# Patient Record
Sex: FEMALE | Race: WHITE | Employment: OTHER | URBAN - METROPOLITAN AREA
[De-identification: names, ages, dates, MRNs, and addresses within clinical notes are randomized per-mention and may not be internally consistent; named-entity substitution may affect disease eponyms.]

---

## 2024-09-16 LAB
BILIRUB UR QL STRIP.AUTO: NEGATIVE
FLUAV + FLUBV RNA SPEC NAA+PROBE: NEGATIVE
FLUAV + FLUBV RNA SPEC NAA+PROBE: NEGATIVE
GLUCOSE UR STRIP.AUTO-MCNC: NORMAL MG/DL
KETONES UR STRIP.AUTO-MCNC: NEGATIVE MG/DL
LEUKOCYTE ESTERASE UR QL STRIP.AUTO: 500
PH UR STRIP.AUTO: 5.5 [PH] (ref 5–8)
PROT UR STRIP.AUTO-MCNC: 50 MG/DL
RBC #/AREA URNS AUTO: >10 /HPF
RSV RNA SPEC NAA+PROBE: NEGATIVE
SARS-COV-2 RNA RESP QL NAA+PROBE: NOT DETECTED
SP GR UR STRIP.AUTO: 1.01 (ref 1–1.03)
UROBILINOGEN UR STRIP.AUTO-MCNC: NORMAL MG/DL
WBC #/AREA URNS AUTO: >50 /HPF

## 2024-09-16 PROCEDURE — 87088 URINE BACTERIA CULTURE: CPT

## 2024-09-16 PROCEDURE — 36415 COLL VENOUS BLD VENIPUNCTURE: CPT

## 2024-09-16 PROCEDURE — 87186 SC STD MICRODIL/AGAR DIL: CPT

## 2024-09-16 PROCEDURE — 99285 EMERGENCY DEPT VISIT HI MDM: CPT

## 2024-09-16 PROCEDURE — 87086 URINE CULTURE/COLONY COUNT: CPT

## 2024-09-16 PROCEDURE — 81001 URINALYSIS AUTO W/SCOPE: CPT

## 2024-09-16 PROCEDURE — 0241U SARS-COV-2/FLU A AND B/RSV BY PCR (GENEXPERT): CPT

## 2024-09-16 RX ORDER — IBUPROFEN 600 MG/1
600 TABLET, FILM COATED ORAL EVERY 6 HOURS PRN
Status: DISCONTINUED | OUTPATIENT
Start: 2024-09-16 | End: 2024-09-16

## 2024-09-16 RX ORDER — IBUPROFEN 600 MG/1
600 TABLET, FILM COATED ORAL ONCE
Status: COMPLETED | OUTPATIENT
Start: 2024-09-16 | End: 2024-09-16

## 2024-09-17 ENCOUNTER — APPOINTMENT (OUTPATIENT)
Dept: GENERAL RADIOLOGY | Facility: HOSPITAL | Age: 79
End: 2024-09-17
Attending: EMERGENCY MEDICINE
Payer: MEDICARE

## 2024-09-17 ENCOUNTER — APPOINTMENT (OUTPATIENT)
Dept: CT IMAGING | Facility: HOSPITAL | Age: 79
End: 2024-09-17
Attending: HOSPITALIST
Payer: MEDICARE

## 2024-09-17 ENCOUNTER — HOSPITAL ENCOUNTER (INPATIENT)
Facility: HOSPITAL | Age: 79
LOS: 1 days | Discharge: HOME | End: 2024-09-19
Attending: EMERGENCY MEDICINE | Admitting: HOSPITALIST
Payer: MEDICARE

## 2024-09-17 DIAGNOSIS — A41.9 SEPSIS DUE TO URINARY TRACT INFECTION (HCC): Primary | ICD-10-CM

## 2024-09-17 DIAGNOSIS — N39.0 SEPSIS DUE TO URINARY TRACT INFECTION (HCC): Primary | ICD-10-CM

## 2024-09-17 LAB
ALBUMIN SERPL-MCNC: 4.1 G/DL (ref 3.2–4.8)
ALBUMIN/GLOB SERPL: 1.1 {RATIO} (ref 1–2)
ALP LIVER SERPL-CCNC: 81 U/L
ALT SERPL-CCNC: 16 U/L
ANION GAP SERPL CALC-SCNC: 11 MMOL/L (ref 0–18)
AST SERPL-CCNC: 21 U/L (ref ?–34)
BASOPHILS # BLD AUTO: 0.05 X10(3) UL (ref 0–0.2)
BASOPHILS NFR BLD AUTO: 0.2 %
BILIRUB SERPL-MCNC: 0.7 MG/DL (ref 0.2–1.1)
BUN BLD-MCNC: 32 MG/DL (ref 9–23)
CALCIUM BLD-MCNC: 10.1 MG/DL (ref 8.7–10.4)
CHLORIDE SERPL-SCNC: 97 MMOL/L (ref 98–112)
CO2 SERPL-SCNC: 24 MMOL/L (ref 21–32)
CREAT BLD-MCNC: 1.09 MG/DL
EGFRCR SERPLBLD CKD-EPI 2021: 52 ML/MIN/1.73M2 (ref 60–?)
EOSINOPHIL # BLD AUTO: 0 X10(3) UL (ref 0–0.7)
EOSINOPHIL NFR BLD AUTO: 0 %
ERYTHROCYTE [DISTWIDTH] IN BLOOD BY AUTOMATED COUNT: 12.8 %
GLOBULIN PLAS-MCNC: 3.8 G/DL (ref 2–3.5)
GLUCOSE BLD-MCNC: 129 MG/DL (ref 70–99)
GLUCOSE BLD-MCNC: 142 MG/DL (ref 70–99)
GLUCOSE BLD-MCNC: 159 MG/DL (ref 70–99)
GLUCOSE BLD-MCNC: 177 MG/DL (ref 70–99)
GLUCOSE BLD-MCNC: 185 MG/DL (ref 70–99)
GLUCOSE BLD-MCNC: 201 MG/DL (ref 70–99)
HCT VFR BLD AUTO: 33.7 %
HGB BLD-MCNC: 11.8 G/DL
IMM GRANULOCYTES # BLD AUTO: 0.11 X10(3) UL (ref 0–1)
IMM GRANULOCYTES NFR BLD: 0.5 %
LACTATE SERPL-SCNC: 1.1 MMOL/L (ref 0.5–2)
LYMPHOCYTES # BLD AUTO: 1.53 X10(3) UL (ref 1–4)
LYMPHOCYTES NFR BLD AUTO: 7.6 %
MCH RBC QN AUTO: 30.9 PG (ref 26–34)
MCHC RBC AUTO-ENTMCNC: 35 G/DL (ref 31–37)
MCV RBC AUTO: 88.2 FL
MONOCYTES # BLD AUTO: 1.2 X10(3) UL (ref 0.1–1)
MONOCYTES NFR BLD AUTO: 6 %
NEUTROPHILS # BLD AUTO: 17.16 X10 (3) UL (ref 1.5–7.7)
NEUTROPHILS # BLD AUTO: 17.16 X10(3) UL (ref 1.5–7.7)
NEUTROPHILS NFR BLD AUTO: 85.7 %
OSMOLALITY SERPL CALC.SUM OF ELEC: 287 MOSM/KG (ref 275–295)
PLATELET # BLD AUTO: 239 10(3)UL (ref 150–450)
POTASSIUM SERPL-SCNC: 3.2 MMOL/L (ref 3.5–5.1)
PROT SERPL-MCNC: 7.9 G/DL (ref 5.7–8.2)
RBC # BLD AUTO: 3.82 X10(6)UL
SODIUM SERPL-SCNC: 132 MMOL/L (ref 136–145)
WBC # BLD AUTO: 20.1 X10(3) UL (ref 4–11)

## 2024-09-17 PROCEDURE — 80053 COMPREHEN METABOLIC PANEL: CPT

## 2024-09-17 PROCEDURE — 85025 COMPLETE CBC W/AUTO DIFF WBC: CPT

## 2024-09-17 PROCEDURE — 87040 BLOOD CULTURE FOR BACTERIA: CPT | Performed by: EMERGENCY MEDICINE

## 2024-09-17 PROCEDURE — 82962 GLUCOSE BLOOD TEST: CPT

## 2024-09-17 PROCEDURE — 96365 THER/PROPH/DIAG IV INF INIT: CPT

## 2024-09-17 PROCEDURE — 71045 X-RAY EXAM CHEST 1 VIEW: CPT | Performed by: EMERGENCY MEDICINE

## 2024-09-17 PROCEDURE — 83605 ASSAY OF LACTIC ACID: CPT | Performed by: EMERGENCY MEDICINE

## 2024-09-17 PROCEDURE — 96366 THER/PROPH/DIAG IV INF ADDON: CPT

## 2024-09-17 PROCEDURE — 74176 CT ABD & PELVIS W/O CONTRAST: CPT | Performed by: HOSPITALIST

## 2024-09-17 RX ORDER — PHENOL 1.4 %
10 AEROSOL, SPRAY (ML) MUCOUS MEMBRANE NIGHTLY PRN
COMMUNITY

## 2024-09-17 RX ORDER — PRAVASTATIN SODIUM 20 MG
20 TABLET ORAL NIGHTLY
Status: DISCONTINUED | OUTPATIENT
Start: 2024-09-17 | End: 2024-09-19

## 2024-09-17 RX ORDER — NICOTINE POLACRILEX 4 MG
30 LOZENGE BUCCAL
Status: DISCONTINUED | OUTPATIENT
Start: 2024-09-17 | End: 2024-09-19

## 2024-09-17 RX ORDER — SODIUM CHLORIDE 9 MG/ML
INJECTION, SOLUTION INTRAVENOUS CONTINUOUS
Status: ACTIVE | OUTPATIENT
Start: 2024-09-17 | End: 2024-09-18

## 2024-09-17 RX ORDER — SODIUM PHOSPHATE, DIBASIC AND SODIUM PHOSPHATE, MONOBASIC 7; 19 G/230ML; G/230ML
1 ENEMA RECTAL ONCE AS NEEDED
Status: DISCONTINUED | OUTPATIENT
Start: 2024-09-17 | End: 2024-09-19

## 2024-09-17 RX ORDER — SENNOSIDES 8.6 MG
17.2 TABLET ORAL NIGHTLY PRN
Status: DISCONTINUED | OUTPATIENT
Start: 2024-09-17 | End: 2024-09-19

## 2024-09-17 RX ORDER — BISACODYL 10 MG
10 SUPPOSITORY, RECTAL RECTAL
Status: DISCONTINUED | OUTPATIENT
Start: 2024-09-17 | End: 2024-09-19

## 2024-09-17 RX ORDER — AMLODIPINE BESYLATE 2.5 MG/1
2.5 TABLET ORAL DAILY
Status: DISCONTINUED | OUTPATIENT
Start: 2024-09-17 | End: 2024-09-19

## 2024-09-17 RX ORDER — NICOTINE POLACRILEX 4 MG
15 LOZENGE BUCCAL
Status: DISCONTINUED | OUTPATIENT
Start: 2024-09-17 | End: 2024-09-19

## 2024-09-17 RX ORDER — HEPARIN SODIUM 5000 [USP'U]/ML
5000 INJECTION, SOLUTION INTRAVENOUS; SUBCUTANEOUS EVERY 8 HOURS SCHEDULED
Status: DISCONTINUED | OUTPATIENT
Start: 2024-09-17 | End: 2024-09-19

## 2024-09-17 RX ORDER — AMLODIPINE BESYLATE 2.5 MG/1
2.5 TABLET ORAL DAILY
COMMUNITY

## 2024-09-17 RX ORDER — DEXTROSE MONOHYDRATE 25 G/50ML
50 INJECTION, SOLUTION INTRAVENOUS
Status: DISCONTINUED | OUTPATIENT
Start: 2024-09-17 | End: 2024-09-19

## 2024-09-17 RX ORDER — ACETAMINOPHEN 500 MG
1000 TABLET ORAL ONCE
Status: COMPLETED | OUTPATIENT
Start: 2024-09-17 | End: 2024-09-17

## 2024-09-17 RX ORDER — POLYETHYLENE GLYCOL 3350 17 G/17G
17 POWDER, FOR SOLUTION ORAL DAILY PRN
Status: DISCONTINUED | OUTPATIENT
Start: 2024-09-17 | End: 2024-09-19

## 2024-09-17 RX ORDER — ASPIRIN 81 MG/1
81 TABLET ORAL DAILY
Status: DISCONTINUED | OUTPATIENT
Start: 2024-09-17 | End: 2024-09-19

## 2024-09-17 RX ORDER — SODIUM CHLORIDE 9 MG/ML
INJECTION, SOLUTION INTRAVENOUS CONTINUOUS
Status: DISCONTINUED | OUTPATIENT
Start: 2024-09-17 | End: 2024-09-17

## 2024-09-17 RX ORDER — ACETAMINOPHEN 500 MG
500 TABLET ORAL EVERY 4 HOURS PRN
Status: DISCONTINUED | OUTPATIENT
Start: 2024-09-17 | End: 2024-09-19

## 2024-09-17 RX ORDER — GLUCOSAMINE/D3/BOSWELLIA SERRA 1500MG-400
1 TABLET ORAL DAILY
COMMUNITY

## 2024-09-17 RX ORDER — ONDANSETRON 2 MG/ML
4 INJECTION INTRAMUSCULAR; INTRAVENOUS EVERY 6 HOURS PRN
Status: DISCONTINUED | OUTPATIENT
Start: 2024-09-17 | End: 2024-09-19

## 2024-09-17 RX ORDER — FLUTICASONE PROPIONATE AND SALMETEROL 250; 50 UG/1; UG/1
1 POWDER RESPIRATORY (INHALATION) 2 TIMES DAILY
Status: DISCONTINUED | OUTPATIENT
Start: 2024-09-17 | End: 2024-09-19

## 2024-09-17 RX ORDER — METOCLOPRAMIDE HYDROCHLORIDE 5 MG/ML
10 INJECTION INTRAMUSCULAR; INTRAVENOUS EVERY 8 HOURS PRN
Status: DISCONTINUED | OUTPATIENT
Start: 2024-09-17 | End: 2024-09-19

## 2024-09-17 NOTE — H&P
[facility] H&P/Consult note       CC:   Chief Complaint   Patient presents with    Fever        PCP: Elly Ordoñez MD    History of Present Illness: Patient is a 78 year old female with PMH sig for HTN, HLD, Dm2, here for fevers, weakness    Hx from pt and son at bs  For the past 2-3 days has had fevers, chills. Also mild headaches with fevers and runny nose but she states she gets this with her allergies. No sob, cough. No sick contacts. No n/v. No abd pain. No diarrhea. She denied any dysuria but has had frequency which does not appear to be new. She folows with gyn and is scheduled to have pessary in the upconing days            PMH  Past Medical History:    Allergic rhinitis due to allergen    Asthma (HCC)    COPD (chronic obstructive pulmonary disease) (HCC)    Diabetes (HCC)    Eczema    Essential hypertension    GERD (gastroesophageal reflux disease)    Hyperlipidemia    Vitamin D deficiency        PSH  History reviewed. No pertinent surgical history.     ALL:  Allergies   Allergen Reactions    Seasonal UNKNOWN        Home Medications:  Outpatient Medications Marked as Taking for the 9/17/24 encounter (Hospital Encounter)   Medication Sig Dispense Refill    amLODIPine 2.5 MG Oral Tab Take 1 tablet (2.5 mg total) by mouth daily.      Biotin 75940 MCG Oral Tab Take 1 tablet by mouth daily.      Melatonin 10 MG Oral Tab Take 10 tablets by mouth nightly as needed.      losartan-hydroCHLOROthiazide 100-25 MG Oral Tab Take 1 tablet by mouth daily. 90 tablet 1    metFORMIN 500 MG Oral Tab Take 1 tablet (500 mg total) by mouth 2 (two) times daily with meals. 180 tablet 1    fluticasone propionate 50 MCG/ACT Nasal Suspension 2 sprays by Nasal route daily as needed for Allergies.      triamcinolone 0.5 % External Cream Apply 1 Application topically 2 (two) times daily as needed.      cetirizine 10 MG Oral Tab Take 1 tablet (10 mg total) by mouth daily.      fluticasone-salmeterol 250-50 MCG/DOSE Inhalation  Aerosol Powder, Breath Activated Inhale 1 puff into the lungs 2 (two) times daily as needed.      Multiple Vitamins-Minerals (CENTRUM SILVER 50+WOMEN) Oral Tab Take by mouth.      Vitamin E 180 MG (400 UNIT) Oral Cap Take by mouth.      Vitamin D3, Cholecalciferol, (VITAMIN D3) 125 MCG (5000 UT) Oral Cap Take 2,000 Units by mouth daily.      ASPIRIN 81 OR Take 81 mg by mouth daily.      Lovastatin 20 MG Oral Tab Take 1 tablet (20 mg total) by mouth daily. (Patient taking differently: Take 1 tablet (20 mg total) by mouth every evening.) 90 tablet 3         Soc Hx  Social History     Tobacco Use    Smoking status: Never    Smokeless tobacco: Never   Substance Use Topics    Alcohol use: Not Currently        Fam Hx  Family History   Problem Relation Age of Onset    Hypertension Father     Heart Disorder Mother     Diabetes Mother        Review of Systems  Comprehensive ROS reviewed and negative except for what's stated above.  Including negative for chest pain, shortness of breath, syncope.       OBJECTIVE:  /50 (BP Location: Left arm)   Pulse 87   Temp 98.3 °F (36.8 °C) (Oral)   Resp 12   Wt 147 lb 14.9 oz (67.1 kg)   SpO2 96%   BMI 26.63 kg/m²   General:  Alert, no distress, appears stated age.   Head:  Normocephalic, without obvious abnormality, atraumatic.   Eyes:  Sclera anicteric, No conjunctival pallor, EOMs intact.    Throat: MMM   Neck: Supple    Lungs:   Clear to auscultation bilaterally. Normal effort   Chest wall:  No tenderness or deformity.   Heart:  Regular rate and rhythm    Abdomen:   Soft, NT/ND, +bs   Extremities: Atraumatic, no cyanosis or edema.   Skin: No visible rashes or lesions.    Neurologic: Normal strength, no focal deficit appreciated     Diagnostic Data:    CBC/Chem  Recent Labs   Lab 09/17/24  0030   WBC 20.1*   HGB 11.8*   MCV 88.2   .0       Recent Labs   Lab 09/17/24  0030   *   K 3.2*   CL 97*   CO2 24.0   BUN 32*   CREATSERUM 1.09*   *   CA 10.1        Recent Labs   Lab 09/17/24  0030   ALT 16   AST 21   ALB 4.1       No results for input(s): \"TROP\" in the last 168 hours.         CXR: image personally reviewed     Radiology: XR CHEST AP PORTABLE  (CPT=71045)    Result Date: 9/17/2024  PROCEDURE:  XR CHEST AP PORTABLE  (CPT=71045)  TECHNIQUE:  AP chest radiograph was obtained.  COMPARISON:  None.  INDICATIONS:  fever and chills  PATIENT STATED HISTORY: (As transcribed by Technologist)  Patient shares she has fever and chills.    FINDINGS:  Cardiac size and pulmonary vasculature are within normal limits. No pleural effusions. No pneumothorax.  Minimal left basilar and right infrahilar opacity.  Atheromatous calcifications of the aorta.             CONCLUSION:  1. Minimal left basilar and right infrahilar opacity representing atelectasis versus infiltrates. 2. Hyperexpansion of the lungs. 3.  Preliminary report was provided by [facility] at time of examination.  Final report confirms findings on preliminary report without discrepancy.   LOCATION:  [facility]      Dictated by (CST): Dylan Mak MD on 9/17/2024 at 5:23 AM     Finalized by (CST): Dylan Mak MD on 9/17/2024 at 5:24 AM          ASSESSMENT / PLAN:     Patient is a 78 year old female with PMH sig for HTN, HLD, Dm2, here for fevers, weakness    Impression    -sepsis (fever, wbc, tachy) from  -UTI      -HTN  -HLD  -Dm 2  -hypokalemia  -hyponatremia  -mild anemia      Plan    -IVFs  -IVF abx - Rocephin  -blood cx pending  -Ucx pending  -check CT AP     -cont norvasc. Bp controlled currently  -hold acei /hydrochlorothiazide - can resume at later time if electrolytes ok and bp is high   -cont statin  -cont ISS    Scds  Heparin      Further recommendations pending patient's clinical course. [facility] hospitalist to continue to follow patient while in house    Patient and/or patient's family given opportunity to ask questions and note understanding and agreeing with therapeutic plan as  [facility] Hospitalist  [phone]  Answering Service: [phone]

## 2024-09-17 NOTE — PLAN OF CARE
Resumed care at 0730. Aox4, vitals stable. Ambulating in bathroom, hallways steady gait. IVF infusing per order, tolerating PO diet. Denies pain.

## 2024-09-17 NOTE — ED PROVIDER NOTES
Patient Seen in: [facility] Emergency Department      History     Chief Complaint   Patient presents with    Fever     Stated Complaint: fever and chills    Subjective:   HPI    78-year-old female presenting emergency department for fever.  Since last night the patient's been having fevers and chills no cough no difficulty breathing no abdominal pain no other exacerbating relieving factors or associated symptoms.    Objective:   Past Medical History:    Allergic rhinitis due to allergen    Asthma (HCC)    COPD (chronic obstructive pulmonary disease) (HCC)    Diabetes (HCC)    Eczema    Essential hypertension    GERD (gastroesophageal reflux disease)    Hyperlipidemia    Vitamin D deficiency              History reviewed. No pertinent surgical history.             Social History     Socioeconomic History    Marital status:    Tobacco Use    Smoking status: Never    Smokeless tobacco: Never   Vaping Use    Vaping status: Never Used   Substance and Sexual Activity    Alcohol use: Not Currently    Drug use: Never              Review of Systems    Positive for stated Chief Complaint: Fever    Other systems are as noted in HPI.  Constitutional and vital signs reviewed.      All other systems reviewed and negative except as noted above.    Physical Exam     ED Triage Vitals [09/16/24 2256]   /69   Pulse (!) 128   Resp 16   Temp (!) 101.6 °F (38.7 °C)   Temp src Oral   SpO2 95 %   O2 Device None (Room air)       Current Vitals:   Vital Signs  BP: 140/52  Pulse: 100  Resp: 16  Temp: 99 °F (37.2 °C)  Temp src: Oral  MAP (mmHg): 77    Oxygen Therapy  SpO2: 98 %  O2 Device: None (Room air)            Physical Exam  Awake alert patient appears no distress HEENT exam is normal lungs were clear cardiovascular exam tachycardic abdomen soft nontender extremities no COVID cyanosis or edema no rash no focal neurologic deficits no photophobia       ED Course     Labs Reviewed   CBC WITH DIFFERENTIAL WITH PLATELET -  Abnormal; Notable for the following components:       Result Value    WBC 20.1 (*)     HGB 11.8 (*)     HCT 33.7 (*)     Neutrophil Absolute Prelim 17.16 (*)     Neutrophil Absolute 17.16 (*)     Monocyte Absolute 1.20 (*)     All other components within normal limits   COMP METABOLIC PANEL (14) - Abnormal; Notable for the following components:    Glucose 201 (*)     Sodium 132 (*)     Potassium 3.2 (*)     Chloride 97 (*)     BUN 32 (*)     Creatinine 1.09 (*)     eGFR-Cr 52 (*)     Globulin  3.8 (*)     All other components within normal limits   URINALYSIS WITH CULTURE REFLEX - Abnormal; Notable for the following components:    Clarity Urine Turbid (*)     Blood Urine 2+ (*)     Protein Urine 50 (*)     Nitrite Urine 2+ (*)     Leukocyte Esterase Urine 500 (*)     WBC Urine >50 (*)     RBC Urine >10 (*)     Bacteria Urine 1+ (*)     Squamous Epi. Cells Few (*)     All other components within normal limits   LACTIC ACID, PLASMA - Normal   SARS-COV-2/FLU A AND B/RSV BY PCR (GENEXPERT) - Normal    Narrative:     This test is intended for the qualitative detection and differentiation of SARS-CoV-2, influenza A, influenza B, and respiratory syncytial virus (RSV) viral RNA in nasopharyngeal or nares swabs from individuals suspected of respiratory viral infection consistent with COVID-19 by their healthcare provider. Signs and symptoms of respiratory viral infection due to SARS-CoV-2, influenza, and RSV can be similar.    Test performed using the Xpert Xpress SARS-CoV-2/FLU/RSV (real time RT-PCR)  assay on the GeneXpert instrument, [employer], [place] [address].   This test is being used under the Food and Drug Administration's Emergency Use Authorization.    The authorized Fact Sheet for Healthcare Providers for this assay is available upon request from the laboratory.   BLOOD CULTURE   BLOOD CULTURE   URINE CULTURE, ROUTINE             Differential diagnosis includes sepsis, UTI         MDM                                          Medical Decision Making  Or 78-year-old female presenting to the emergency department for fever.  IV established cardiac monitor shows a sinus rhythm pulse ox shows no signs of hypoxia urinalysis shows signs of infection Rocephin ordered.  White cell count is elevated metabolic panel shows an increase in renal sufficiency patient was given IV fluids antibiotics will be admitted at this time lactic acid is within acceptable limits independent interpretation by ED physician chest x-ray shows no focal consolidation but does show atelectasis versus infiltrate patient has no respiratory symptoms symptoms seem to be more based in likely urinary as a source of infection patient will be admitted at this time    Problems Addressed:  Sepsis due to urinary tract infection (HCC): acute illness or injury    Amount and/or Complexity of Data Reviewed  Labs: ordered. Decision-making details documented in ED Course.  Radiology: ordered and independent interpretation performed. Decision-making details documented in ED Course.  ECG/medicine tests: ordered and independent interpretation performed. Decision-making details documented in ED Course.        Disposition and Plan     Clinical Impression:  1. Sepsis due to urinary tract infection (HCC)         Disposition:  There is no disposition on file for this visit.  There is no disposition time on file for this visit.    Follow-up:  No follow-up provider specified.        Medications Prescribed:  Current Discharge Medication List

## 2024-09-17 NOTE — ED INITIAL ASSESSMENT (HPI)
Pt arrives to ed w c/o fever/chills/headache. Pt reports tylenol around 2000. Pt denies NVD/cp/sob. Pt denies sick contacts.

## 2024-09-17 NOTE — PLAN OF CARE
NURSING ADMISSION NOTE      Patient admitted via Cart  Oriented to room.  Safety precautions initiated.  Bed in low position.  Call light in reach.    Received patient from ED accompanied by son, Robinson with chief complaints of fever, chills and headache. Dx Sepsis due to UTI.  Up steady on her feet.  IV fluids.  Fall precaution.  QID acccuchecks, 142 thi am.  0630: was shivering, T 98.3 orally.Placed extra blanket per request.     Problem: Diabetes/Glucose Control  Goal: Glucose maintained within prescribed range  Description: INTERVENTIONS:  - Monitor Blood Glucose as ordered  - Assess for signs and symptoms of hyperglycemia and hypoglycemia  - Administer ordered medications to maintain glucose within target range  - Assess barriers to adequate nutritional intake and initiate nutrition consult as needed  - Instruct patient on self management of diabetes  Outcome: Progressing     Problem: Patient/Family Goals  Goal: Patient/Family Long Term Goal  Description: Patient's Long Term Goal:Discharge home when stable     Interventions:  - QID accuchecks  - monitor VS, labs, intake and output  - fall precaution  - O2 protocol.  - IV antibiotics  - IV fluids.   - See additional Care Plan goals for specific interventions  Outcome: Progressing  Goal: Patient/Family Short Term Goal  Description: Patient's Short Term Goal: VS stable this shift    Interventions:   - QID accuchecks  - monitor VS, labs, intake and output  - fall precaution  - O2 protocol.  - IV antibiotics  - IV fluids.     - See additional Care Plan goals for specific interventions  Outcome: Progressing     Problem: METABOLIC/FLUID AND ELECTROLYTES - ADULT  Goal: Glucose maintained within prescribed range  Description: INTERVENTIONS:  - Monitor Blood Glucose as ordered  - Assess for signs and symptoms of hyperglycemia and hypoglycemia  - Administer ordered medications to maintain glucose within target range  - Assess barriers to adequate nutritional intake and  initiate nutrition consult as needed  - Instruct patient on self management of diabetes  Outcome: Progressing  Goal: Electrolytes maintained within normal limits  Description: INTERVENTIONS:  - Monitor labs and rhythm and assess patient for signs and symptoms of electrolyte imbalances  - Administer electrolyte replacement as ordered  - Monitor response to electrolyte replacements, including rhythm and repeat lab results as appropriate  - Fluid restriction as ordered  - Instruct patient on fluid and nutrition restrictions as appropriate  Outcome: Progressing     Problem: SAFETY ADULT - FALL  Goal: Free from fall injury  Description: INTERVENTIONS:  - Assess pt frequently for physical needs  - Identify cognitive and physical deficits and behaviors that affect risk of falls.  - [place] fall precautions as indicated by assessment.  - Educate pt/family on patient safety including physical limitations  - Instruct pt to call for assistance with activity based on assessment  - Modify environment to reduce risk of injury  - Provide assistive devices as appropriate  - Consider OT/PT consult to assist with strengthening/mobility  - Encourage toileting schedule  Outcome: Progressing

## 2024-09-18 LAB
ANION GAP SERPL CALC-SCNC: 6 MMOL/L (ref 0–18)
BUN BLD-MCNC: 16 MG/DL (ref 9–23)
CALCIUM BLD-MCNC: 9.3 MG/DL (ref 8.7–10.4)
CHLORIDE SERPL-SCNC: 107 MMOL/L (ref 98–112)
CO2 SERPL-SCNC: 27 MMOL/L (ref 21–32)
CREAT BLD-MCNC: 0.85 MG/DL
EGFRCR SERPLBLD CKD-EPI 2021: 71 ML/MIN/1.73M2 (ref 60–?)
ERYTHROCYTE [DISTWIDTH] IN BLOOD BY AUTOMATED COUNT: 13.2 %
GLUCOSE BLD-MCNC: 129 MG/DL (ref 70–99)
GLUCOSE BLD-MCNC: 131 MG/DL (ref 70–99)
GLUCOSE BLD-MCNC: 137 MG/DL (ref 70–99)
GLUCOSE BLD-MCNC: 210 MG/DL (ref 70–99)
HCT VFR BLD AUTO: 30.9 %
HGB BLD-MCNC: 10.5 G/DL
MAGNESIUM SERPL-MCNC: 2.1 MG/DL (ref 1.6–2.6)
MCH RBC QN AUTO: 31.3 PG (ref 26–34)
MCHC RBC AUTO-ENTMCNC: 34 G/DL (ref 31–37)
MCV RBC AUTO: 92 FL
OSMOLALITY SERPL CALC.SUM OF ELEC: 293 MOSM/KG (ref 275–295)
PLATELET # BLD AUTO: 240 10(3)UL (ref 150–450)
PLATELETS.RETICULATED NFR BLD AUTO: 2 % (ref 0–7)
POTASSIUM SERPL-SCNC: 3.7 MMOL/L (ref 3.5–5.1)
RBC # BLD AUTO: 3.36 X10(6)UL
SODIUM SERPL-SCNC: 140 MMOL/L (ref 136–145)
WBC # BLD AUTO: 16.2 X10(3) UL (ref 4–11)

## 2024-09-18 PROCEDURE — 82962 GLUCOSE BLOOD TEST: CPT

## 2024-09-18 PROCEDURE — 85027 COMPLETE CBC AUTOMATED: CPT | Performed by: HOSPITALIST

## 2024-09-18 PROCEDURE — 80048 BASIC METABOLIC PNL TOTAL CA: CPT | Performed by: HOSPITALIST

## 2024-09-18 PROCEDURE — 83735 ASSAY OF MAGNESIUM: CPT | Performed by: HOSPITALIST

## 2024-09-18 NOTE — PLAN OF CARE
Pt has been stable, afebrile, voices no complaints. Urine cultures pending, on IV antibiotics. Ambulates in halls with steady gait.

## 2024-09-18 NOTE — PLAN OF CARE
Received patient awake sitting at the edge of the bed. Family at bedside.  Denies any pain or any shortness of breath.   Had low grade temp of 99.3.   IV fluids.  IV Ceftriaxone q 24hrs.  Up ambulating in the bathroom, steady on her feet.   QID accuchecks, refused insulin coverage tonight.   Non-cardiac electrolyte protocol, K 3.2, will check K in am.   Blood culture and Urine culture pending.   CBC, BMP and Mg in am.     Problem: Diabetes/Glucose Control  Goal: Glucose maintained within prescribed range  Description: INTERVENTIONS:  - Monitor Blood Glucose as ordered  - Assess for signs and symptoms of hyperglycemia and hypoglycemia  - Administer ordered medications to maintain glucose within target range  - Assess barriers to adequate nutritional intake and initiate nutrition consult as needed  - Instruct patient on self management of diabetes  Outcome: Progressing     Problem: Patient/Family Goals  Goal: Patient/Family Long Term Goal  Description: Patient's Long Term Goal:Discharge home when stable     Interventions:  - QID accuchecks  - monitor VS, labs, intake and output  - fall precaution  - O2 protocol.  - IV antibiotics  - IV fluids.   - Non- cardiac electrolyte protocol.   - See additional Care Plan goals for specific interventions  Outcome: Progressing  Goal: Patient/Family Short Term Goal  Description: Patient's Short Term Goal: VS stable this shift    Interventions:   - QID accuchecks  - monitor VS, labs, intake and output  - fall precaution  - O2 protocol.  - IV antibiotics  - IV fluids.   - Non-cardiac electrolyte protocol.     - See additional Care Plan goals for specific interventions  Outcome: Progressing     Problem: METABOLIC/FLUID AND ELECTROLYTES - ADULT  Goal: Glucose maintained within prescribed range  Description: INTERVENTIONS:  - Monitor Blood Glucose as ordered  - Assess for signs and symptoms of hyperglycemia and hypoglycemia  - Administer ordered medications to maintain glucose within  target range  - Assess barriers to adequate nutritional intake and initiate nutrition consult as needed  - Instruct patient on self management of diabetes  Outcome: Progressing  Goal: Electrolytes maintained within normal limits  Description: INTERVENTIONS:  - Monitor labs and rhythm and assess patient for signs and symptoms of electrolyte imbalances  - Administer electrolyte replacement as ordered  - Monitor response to electrolyte replacements, including rhythm and repeat lab results as appropriate  - Fluid restriction as ordered  - Instruct patient on fluid and nutrition restrictions as appropriate  Outcome: Progressing     Problem: SAFETY ADULT - FALL  Goal: Free from fall injury  Description: INTERVENTIONS:  - Assess pt frequently for physical needs  - Identify cognitive and physical deficits and behaviors that affect risk of falls.  - [place] fall precautions as indicated by assessment.  - Educate pt/family on patient safety including physical limitations  - Instruct pt to call for assistance with activity based on assessment  - Modify environment to reduce risk of injury  - Provide assistive devices as appropriate  - Consider OT/PT consult to assist with strengthening/mobility  - Encourage toileting schedule  Outcome: Progressing

## 2024-09-18 NOTE — PROGRESS NOTES
CC: follow-up hospital admission uti    SUBJECTIVE:  Interval History:     Feels better today  More energy  Has no pain  Eating ok  Dw RN as well    OBJECTIVE:  Scheduled Meds:    amLODIPine  2.5 mg Oral Daily    aspirin  81 mg Oral Daily    fluticasone-salmeterol  1 puff Inhalation BID    pravastatin  20 mg Oral Nightly    heparin  5,000 Units Subcutaneous Q8H NOEL    insulin aspart  1-5 Units Subcutaneous TID AC and HS    cefTRIAXone  2 g Intravenous Nightly     Continuous Infusions:    sodium chloride 100 mL/hr at 09/17/24 2114     PRN Meds:   acetaminophen    polyethylene glycol (PEG 3350)    sennosides    bisacodyl    fleet enema    ondansetron    metoclopramide    glucose **OR** glucose **OR** glucose-vitamin C **OR** dextrose **OR** glucose **OR** glucose **OR** glucose-vitamin C    melatonin    PHYSICAL EXAM  Vital signs: Temp:  [97.8 °F (36.6 °C)-99.3 °F (37.4 °C)] 99 °F (37.2 °C)  Pulse:  [105-108] 108  Resp:  [16-18] 18  BP: (119-127)/(44-56) 122/44  SpO2:  [97 %-98 %] 97 %      GENERAL - NAD, AAO  EYES- sclera anicteric,   HENT- normocephalic, OP - MMM  NECK - no JVD  CV- RRR  RESP - CTAB, normal resp effort  ABDOMEN- soft, NT/ND   EXT- no LE edema   PSYCH - normal mentation/ normal affect    Data Review:   Labs:   Recent Labs   Lab 09/17/24  0030   WBC 20.1*   HGB 11.8*   MCV 88.2   .0       Recent Labs   Lab 09/17/24  0030   *   K 3.2*   CL 97*   CO2 24.0   BUN 32*   CREATSERUM 1.09*   CA 10.1   *       Recent Labs   Lab 09/17/24  0030   ALT 16   AST 21   ALB 4.1       Recent Labs   Lab 09/17/24  1043 09/17/24  1228 09/17/24  1743 09/17/24  2049 09/18/24  0545   PGLU 185* 177* 129* 159* 131*           ASSESSMENT/PLAN:    Patient is a 78 year old female with PMH sig for HTN, HLD, Dm2, here for fevers, weakness     Impression     -sepsis (fever, wbc, tachy) from  -UTI  / pyelo     -HTN  -HLD  -Dm 2  -hypokalemia  -hyponatremia  -mild anemia        Plan     -IVFs - plan to stop  today  -IVF abx - Rocephin  -blood cx ngtd  -Ucx pending  -check CT AP - bl peripenhric stranding noted, no stones      -cont norvasc. Bp normotensive currently  -hold acei /hydrochlorothiazide - can resume at later time if electrolytes ok and bp is high. Labs pending for today  -cont statin  -cont ISS     Scds  Heparin    Dispo - likely dc tomorrow pending cx results      Will continue to follow while hospitalized. Please page me or the on-call hospitalist with questions or concerns.    Alex Solorzano Hospitalist  [phone]  Answering Service: [phone]

## 2024-09-19 VITALS
OXYGEN SATURATION: 96 % | BODY MASS INDEX: 27 KG/M2 | DIASTOLIC BLOOD PRESSURE: 86 MMHG | TEMPERATURE: 99 F | WEIGHT: 147.94 LBS | SYSTOLIC BLOOD PRESSURE: 139 MMHG | RESPIRATION RATE: 18 BRPM | HEART RATE: 98 BPM

## 2024-09-19 PROBLEM — A41.9 SEPSIS DUE TO URINARY TRACT INFECTION (HCC): Status: RESOLVED | Noted: 2024-09-17 | Resolved: 2024-09-19

## 2024-09-19 PROBLEM — N39.0 SEPSIS DUE TO URINARY TRACT INFECTION (HCC): Status: RESOLVED | Noted: 2024-09-17 | Resolved: 2024-09-19

## 2024-09-19 LAB
ANION GAP SERPL CALC-SCNC: 8 MMOL/L (ref 0–18)
BUN BLD-MCNC: 11 MG/DL (ref 9–23)
CALCIUM BLD-MCNC: 9.3 MG/DL (ref 8.7–10.4)
CHLORIDE SERPL-SCNC: 109 MMOL/L (ref 98–112)
CO2 SERPL-SCNC: 26 MMOL/L (ref 21–32)
CREAT BLD-MCNC: 0.85 MG/DL
EGFRCR SERPLBLD CKD-EPI 2021: 71 ML/MIN/1.73M2 (ref 60–?)
ERYTHROCYTE [DISTWIDTH] IN BLOOD BY AUTOMATED COUNT: 13.2 %
GLUCOSE BLD-MCNC: 128 MG/DL (ref 70–99)
GLUCOSE BLD-MCNC: 137 MG/DL (ref 70–99)
HCT VFR BLD AUTO: 31.8 %
HGB BLD-MCNC: 10.6 G/DL
MAGNESIUM SERPL-MCNC: 2.1 MG/DL (ref 1.6–2.6)
MCH RBC QN AUTO: 30.3 PG (ref 26–34)
MCHC RBC AUTO-ENTMCNC: 33.3 G/DL (ref 31–37)
MCV RBC AUTO: 90.9 FL
OSMOLALITY SERPL CALC.SUM OF ELEC: 298 MOSM/KG (ref 275–295)
PLATELET # BLD AUTO: 259 10(3)UL (ref 150–450)
POTASSIUM SERPL-SCNC: 3.9 MMOL/L (ref 3.5–5.1)
RBC # BLD AUTO: 3.5 X10(6)UL
SODIUM SERPL-SCNC: 143 MMOL/L (ref 136–145)
WBC # BLD AUTO: 12.1 X10(3) UL (ref 4–11)

## 2024-09-19 PROCEDURE — 80048 BASIC METABOLIC PNL TOTAL CA: CPT | Performed by: HOSPITALIST

## 2024-09-19 PROCEDURE — 83735 ASSAY OF MAGNESIUM: CPT | Performed by: HOSPITALIST

## 2024-09-19 PROCEDURE — 85027 COMPLETE CBC AUTOMATED: CPT | Performed by: HOSPITALIST

## 2024-09-19 PROCEDURE — 82962 GLUCOSE BLOOD TEST: CPT

## 2024-09-19 RX ORDER — CIPROFLOXACIN 500 MG/1
500 TABLET, FILM COATED ORAL
Status: DISCONTINUED | OUTPATIENT
Start: 2024-09-19 | End: 2024-09-19

## 2024-09-19 RX ORDER — CIPROFLOXACIN 500 MG/1
500 TABLET, FILM COATED ORAL 2 TIMES DAILY
Qty: 6 TABLET | Refills: 0 | Status: SHIPPED | OUTPATIENT
Start: 2024-09-19 | End: 2024-09-22

## 2024-09-19 NOTE — PAYOR COMM NOTE
OBS TO INPT:    --------------  ADMISSION REVIEW     Payor: [employer] ADV PPO  Subscriber #:  [id number]  Authorization Number: [id number]    Admit date: 9/18/24  Admit time:  1:36 PM       REVIEW DOCUMENTATION:     ED Provider Notes        Stated Complaint: fever and chills    Subjective:   HPI    78-year-old female presenting emergency department for fever.  Since last night the patient's been having fevers and chills no cough no difficulty breathing no abdominal pain no other exacerbating relieving factors or associated symptoms.      Physical Exam     ED Triage Vitals [09/16/24 9686]   /69   Pulse (!) 128   Resp 16   Temp (!) 101.6 °F (38.7 °C)   Temp src Oral   SpO2 95 %   O2 Device None (Room air)       Current Vitals:   Vital Signs  BP: 140/52  Pulse: 100  Resp: 16  Temp: 99 °F (37.2 °C)  Temp src: Oral  MAP (mmHg): 77    Oxygen Therapy  SpO2: 98 %  O2 Device: None (Room air)    Physical Exam  Awake alert patient appears no distress HEENT exam is normal lungs were clear cardiovascular exam tachycardic abdomen soft nontender extremities no COVID cyanosis or edema no rash no focal neurologic deficits no photophobia       ED Course     Labs Reviewed   CBC WITH DIFFERENTIAL WITH PLATELET - Abnormal; Notable for the following components:       Result Value    WBC 20.1 (*)     HGB 11.8 (*)     HCT 33.7 (*)     Neutrophil Absolute Prelim 17.16 (*)     Neutrophil Absolute 17.16 (*)     Monocyte Absolute 1.20 (*)     All other components within normal limits   COMP METABOLIC PANEL (14) - Abnormal; Notable for the following components:    Glucose 201 (*)     Sodium 132 (*)     Potassium 3.2 (*)     Chloride 97 (*)     BUN 32 (*)     Creatinine 1.09 (*)     eGFR-Cr 52 (*)     Globulin  3.8 (*)     All other components within normal limits   URINALYSIS WITH CULTURE REFLEX - Abnormal; Notable for the following components:    Clarity Urine Turbid (*)     Blood Urine 2+ (*)     Protein Urine 50 (*)     Nitrite  Urine 2+ (*)     Leukocyte Esterase Urine 500 (*)     WBC Urine >50 (*)     RBC Urine >10 (*)     Bacteria Urine 1+ (*)     Squamous Epi. Cells Few (*)     All other components within normal limits   LACTIC ACID, PLASMA - Normal   Differential diagnosis includes sepsis, UTI        MDM        Medical Decision Making  Or 78-year-old female presenting to the emergency department for fever.  IV established cardiac monitor shows a sinus rhythm pulse ox shows no signs of hypoxia urinalysis shows signs of infection Rocephin ordered.  White cell count is elevated metabolic panel shows an increase in renal sufficiency patient was given IV fluids antibiotics will be admitted at this time lactic acid is within acceptable limits independent interpretation by ED physician chest x-ray shows no focal consolidation but does show atelectasis versus infiltrate patient has no respiratory symptoms symptoms seem to be more based in likely urinary as a source of infection patient will be admitted at this time    Problems Addressed:  Sepsis due to urinary tract infection (HCC): acute illness or injury    Amount and/or Complexity of Data Reviewed  Labs: ordered. Decision-making details documented in ED Course.  Radiology: ordered and independent interpretation performed. Decision-making details documented in ED Course.  ECG/medicine tests: ordered and independent interpretation performed. Decision-making details documented in ED Course.        Disposition and Plan     Clinical Impression:  1. Sepsis due to urinary tract infection (HCC)         [facility]ist H&P/Consult note         CC:       Chief Complaint   Patient presents with    Fever         PCP: Elly Ordoñez MD     History of Present Illness: Patient is a 78 year old female with PMH sig for HTN, HLD, Dm2, here for fevers, weakness     Hx from pt and son at bs  For the past 2-3 days has had fevers, chills. Also mild headaches with fevers and runny nose but she states she gets  this with her allergies. No sob, cough. No sick contacts. No n/v. No abd pain. No diarrhea. She denied any dysuria but has had frequency which does not appear to be new. She folows with gyn and is scheduled to have pessary in the upconing days  ASSESSMENT / PLAN:      Patient is a 78 year old female with PMH sig for HTN, HLD, Dm2, here for fevers, weakness     Impression     -sepsis (fever, wbc, tachy) from  -UTI       -HTN  -HLD  -Dm 2  -hypokalemia  -hyponatremia  -mild anemia        Plan     -IVFs  -IVF abx - Rocephin  -blood cx pending  -Ucx pending  -check CT AP      -cont norvasc. Bp controlled currently  -hold acei /hydrochlorothiazide - can resume at later time if electrolytes ok and bp is high   -cont statin  -cont ISS     Scds  Heparin        Further recommendations pending patient's clinical course. [facility] hospitalist to continue to follow patient while in house     Patient and/or patient's family given opportunity to ask questions and note understanding and agreeing with therapeutic plan as outlined     Alex Solorzano Hospitalist  [phone]  Answering Service: [phone]     9/18  HOSPITALIST:    CC: follow-up hospital admission uti     SUBJECTIVE:  Interval History:      Feels better today  More energy  Has no pain  Eating ok  Dw RN as well          ASSESSMENT/PLAN:     Patient is a 78 year old female with PMH sig for HTN, HLD, Dm2, here for fevers, weakness     Impression     -sepsis (fever, wbc, tachy) from  -UTI  / pyelo     -HTN  -HLD  -Dm 2  -hypokalemia  -hyponatremia  -mild anemia        Plan     -IVFs - plan to stop today  -IVF abx - Rocephin  -blood cx ngtd  -Ucx pending  -check CT AP - bl peripenhric stranding noted, no stones      -cont norvasc. Bp normotensive currently  -hold acei /hydrochlorothiazide - can resume at later time if electrolytes ok and bp is high. Labs pending for today  -cont statin  -cont ISS     Scds  Heparin     Dispo - likely dc tomorrow pending cx results         Will continue to follow while hospitalized. Please page me or the on-call hospitalist with questions or concerns.     Alex Solorzano Hospitalist  [phone]  Answering Service: [phone]          MEDICATIONS ADMINISTERED IN LAST 1 DAY:  amLODIPine (Norvasc) tab 2.5 mg       Date Action Dose Route User    9/18/2024 1021 Given 2.5 mg Oral Malena Treadwell RN          aspirin DR tab 81 mg       Date Action Dose Route User    9/18/2024 1021 Given 81 mg Oral Malena Treadwell RN          cefTRIAXone (Rocephin) 2 g in sodium chloride 0.9% 100 mL IVPB-ADDV       Date Action Dose Route User    9/18/2024 2015 New Bag 2 g Intravenous Marguerite Elliott RN          fluticasone-salmeterol (Advair Diskus) 250-50 MCG/ACT inhaler 1 puff       Date Action Dose Route User    9/18/2024 2015 Given 1 puff Inhalation Marguerite Elliott RN          melatonin cap/tab 10 mg       Date Action Dose Route User    9/18/2024 2130 Given 10 mg Oral Marguerite Elliott RN          pravastatin (Pravachol) tab 20 mg       Date Action Dose Route User    9/18/2024 2204 Given 20 mg Oral Marguerite Elliott RN            Vitals (last day)       Date/Time Temp Pulse Resp BP SpO2 Weight O2 Device O2 Flow Rate (L/min) [facility]    09/19/24 0430 98.1 °F (36.7 °C) 93 16 129/56 99 % -- None (Room air) -- LP    09/18/24 2009 98.3 °F (36.8 °C) 87 18 139/71 99 % -- None (Room air) -- LP    09/18/24 1258 98.6 °F (37 °C) 91 18 115/54 96 % -- None (Room air) 0 L/min     09/18/24 0400 99 °F (37.2 °C) -- 18 122/44 97 % -- None (Room air) -- LM

## 2024-09-19 NOTE — PLAN OF CARE
Resumed care at 0730. Aox4, vitals stable. Ambulating in room, hallway. Independent. Denies pain. Tolerating PO diet. Ok to discharge home today. Discharge instructions given and explained to patient, verbalized understanding. IV removed. Granddaughter at bedside to give ride home, wheelchair to lobby with all belongings.

## 2024-09-19 NOTE — DISCHARGE SUMMARY
[facility] Internal Medicine Hospitalist Discharge Summary     Patient ID:  Ashwini Martínez  78 year old  11/28/1945    Admit date: 9/17/2024    Discharge date: 9/19/24    Attending Physician: Eric Elizabeth DO     Primary Care Physician: Elly Ordoñez MD     Discharge Diagnoses: Sepsis 2/2 to UTI    Discharge Condition: stable    Disposition:  Home    Important Follow Ups:  - PCP: 1-2 weeks    Hospital Course:      78 year old female with PMH sig for HTN, HLD, Dm2, here for fevers, weakness. Diagnosed w/ Sepsis 2/2 to UTI. S/p 2 day of CTX, transitioned to orals for completion of therapy as per C/S. Discharge home.    Consults: IP CONSULT TO HOSPITALIST    Operative Procedures:        Patient instructions:      I as the attending physician reconciled the current and discharge medications on day of discharge.     Current Discharge Medication List        START taking these medications    Details   ciprofloxacin 500 MG Oral Tab Take 1 tablet (500 mg total) by mouth 2 (two) times daily for 3 days.           CONTINUE these medications which have NOT CHANGED    Details   amLODIPine 2.5 MG Oral Tab Take 1 tablet (2.5 mg total) by mouth daily.      Biotin 32100 MCG Oral Tab Take 1 tablet by mouth daily.      Melatonin 10 MG Oral Tab Take 10 tablets by mouth nightly as needed.      losartan-hydroCHLOROthiazide 100-25 MG Oral Tab Take 1 tablet by mouth daily.      metFORMIN 500 MG Oral Tab Take 1 tablet (500 mg total) by mouth 2 (two) times daily with meals.      fluticasone propionate 50 MCG/ACT Nasal Suspension 2 sprays by Nasal route daily as needed for Allergies.      triamcinolone 0.5 % External Cream Apply 1 Application topically 2 (two) times daily as needed.      cetirizine 10 MG Oral Tab Take 1 tablet (10 mg total) by mouth daily.      fluticasone-salmeterol 250-50 MCG/DOSE Inhalation Aerosol Powder, Breath Activated Inhale 1 puff into the lungs 2 (two)  times daily as needed.      Multiple Vitamins-Minerals (CENTRUM SILVER 50+WOMEN) Oral Tab Take by mouth.      Vitamin E 180 MG (400 UNIT) Oral Cap Take by mouth.      Vitamin D3, Cholecalciferol, (VITAMIN D3) 125 MCG (5000 UT) Oral Cap Take 2,000 Units by mouth daily.      ASPIRIN 81 OR Take 81 mg by mouth daily.      Lovastatin 20 MG Oral Tab Take 1 tablet (20 mg total) by mouth daily.      Diclofenac Sodium 1 % External Cream Apply 1 g topically in the morning, at noon, and at bedtime.             Activity: activity as tolerated  Diet: cardiac diet  Wound Care: as directed  Code Status: No Order      Exam on day of discharge:     Vitals:    09/19/24 0833   BP: 139/86   Pulse: 98   Resp: 18   Temp: 98.8 °F (37.1 °C)       General: no acute distress, AAOx3  Heart: regular rate and rhythm  Lungs: clear bilaterally, no active wheezing  Abdomen: nontender, nondistended  Extremities: no pedal edema   Neuro: no focal deficits      Total time coordinating care for discharge: Greater than 30 minutes    RASHI [facility] Hospitalist

## 2024-09-19 NOTE — PLAN OF CARE
Patient is  alert and oriented x 4. She is interactive and cooperative with care. She denies chest pain and shortness of breath.  Due meds given. IV antibiotics given. Accucheck done ac & hs. Follow up labs  ordered for tomorrow. .Falls and safety precautions maintained.   Problem: METABOLIC/FLUID AND ELECTROLYTES - ADULT  Goal: Glucose maintained within prescribed range  Description: INTERVENTIONS:  - Monitor Blood Glucose as ordered  - Assess for signs and symptoms of hyperglycemia and hypoglycemia  - Administer ordered medications to maintain glucose within target range  - Assess barriers to adequate nutritional intake and initiate nutrition consult as needed  - Instruct patient on self management of diabetes  Outcome: Progressing  Goal: Electrolytes maintained within normal limits  Description: INTERVENTIONS:  - Monitor labs and rhythm and assess patient for signs and symptoms of electrolyte imbalances  - Administer electrolyte replacement as ordered  - Monitor response to electrolyte replacements, including rhythm and repeat lab results as appropriate  - Fluid restriction as ordered  - Instruct patient on fluid and nutrition restrictions as appropriate  Outcome: Progressing     Problem: SAFETY ADULT - FALL  Goal: Free from fall injury  Description: INTERVENTIONS:  - Assess pt frequently for physical needs  - Identify cognitive and physical deficits and behaviors that affect risk of falls.  - [place] fall precautions as indicated by assessment.  - Educate pt/family on patient safety including physical limitations  - Instruct pt to call for assistance with activity based on assessment  - Modify environment to reduce risk of injury  - Provide assistive devices as appropriate  - Consider OT/PT consult to assist with strengthening/mobility  - Encourage toileting schedule  Outcome: Progressing     Problem: Diabetes/Glucose Control  Goal: Glucose maintained within prescribed range  Description: INTERVENTIONS:  -  Monitor Blood Glucose as ordered  - Assess for signs and symptoms of hyperglycemia and hypoglycemia  - Administer ordered medications to maintain glucose within target range  - Assess barriers to adequate nutritional intake and initiate nutrition consult as needed  - Instruct patient on self management of diabetes  Outcome: Progressing     Problem: Patient/Family Goals  Goal: Patient/Family Long Term Goal  Description: Patient's Long Term Goal:Discharge home when stable     Interventions:  - QID accuchecks  - monitor VS, labs, intake and output  - fall precaution  - O2 protocol.  - IV antibiotics  - IV fluids.   - Non- cardiac electrolyte protocol.   -Outcome: Progressing

## 2024-09-19 NOTE — DISCHARGE INSTRUCTIONS
Ms. Tanya,    Thank you for allowing us to take care of your health during your admission at [facility]. You are stable for discharge at this time. Your diagnoses and specific instructions for your medications are listed below. Please ensure you follow up with your PCP in 1-2 weeks on discharge and all other follow up appointments listed below.    Diagnoses: Sepsis due to UTI    Changes to Medications:  - Continue Ciprofloxacin for 3 more days, twice daily    Important Results:  - Your kidney function has improved  - Resume your normal blood pressure medications  - Drink water    Follow Ups:  - PCP in 1-2 weeks    Best Wishes and Good Eric Birmingham D.O.  [facility]

## 2024-09-20 NOTE — PAYOR COMM NOTE
--------------  DISCHARGE REVIEW    Payor: [employer] ADV PPO  Subscriber #:  [id number]  Authorization Number: [id number]    Admit date: 9/18/24  Admit time:   1:36 PM  Discharge Date: 9/19/2024 12:30 PM     Admitting Physician: Ania Casas MD  Attending Physician:  No att. providers found  Primary Care Physician: Elly Ordoñez MD          Discharge Summary Notes        Discharge Summary signed by Eric Elizabeth DO at 9/19/2024 11:43 AM       Author: Eric Elizabeth DO Specialty: Internal Medicine Author Type: Physician    Filed: 9/19/2024 11:43 AM Date of Service: 9/19/2024 11:42 AM Status: Signed    : Eric Elizabeth DO (Physician)                                                          [facility] Internal Medicine Hospitalist Discharge Summary     Patient ID:  Ashwini Martínez  78 year old  11/28/1945    Admit date: 9/17/2024    Discharge date: 9/19/24    Attending Physician: Eric Elizabeth DO     Primary Care Physician: Elly Ordoñez MD     Discharge Diagnoses: Sepsis 2/2 to UTI    Discharge Condition: stable    Disposition:  Home    Important Follow Ups:  - PCP: 1-2 weeks    Hospital Course:      78 year old female with PMH sig for HTN, HLD, Dm2, here for fevers, weakness. Diagnosed w/ Sepsis 2/2 to UTI. S/p 2 day of CTX, transitioned to orals for completion of therapy as per C/S. Discharge home.    Consults: IP CONSULT TO HOSPITALIST    Operative Procedures:        Patient instructions:      I as the attending physician reconciled the current and discharge medications on day of discharge.     Current Discharge Medication List        START taking these medications    Details   ciprofloxacin 500 MG Oral Tab Take 1 tablet (500 mg total) by mouth 2 (two) times daily for 3 days.           CONTINUE these medications which have NOT CHANGED    Details   amLODIPine 2.5 MG Oral Tab Take 1 tablet (2.5 mg total) by mouth daily.      Biotin 10139 MCG Oral Tab Take 1 tablet by mouth daily.      Melatonin 10  MG Oral Tab Take 10 tablets by mouth nightly as needed.      losartan-hydroCHLOROthiazide 100-25 MG Oral Tab Take 1 tablet by mouth daily.      metFORMIN 500 MG Oral Tab Take 1 tablet (500 mg total) by mouth 2 (two) times daily with meals.      fluticasone propionate 50 MCG/ACT Nasal Suspension 2 sprays by Nasal route daily as needed for Allergies.      triamcinolone 0.5 % External Cream Apply 1 Application topically 2 (two) times daily as needed.      cetirizine 10 MG Oral Tab Take 1 tablet (10 mg total) by mouth daily.      fluticasone-salmeterol 250-50 MCG/DOSE Inhalation Aerosol Powder, Breath Activated Inhale 1 puff into the lungs 2 (two) times daily as needed.      Multiple Vitamins-Minerals (CENTRUM SILVER 50+WOMEN) Oral Tab Take by mouth.      Vitamin E 180 MG (400 UNIT) Oral Cap Take by mouth.      Vitamin D3, Cholecalciferol, (VITAMIN D3) 125 MCG (5000 UT) Oral Cap Take 2,000 Units by mouth daily.      ASPIRIN 81 OR Take 81 mg by mouth daily.      Lovastatin 20 MG Oral Tab Take 1 tablet (20 mg total) by mouth daily.      Diclofenac Sodium 1 % External Cream Apply 1 g topically in the morning, at noon, and at bedtime.             Activity: activity as tolerated  Diet: cardiac diet  Wound Care: as directed  Code Status: No Order      Exam on day of discharge:     Vitals:    09/19/24 0833   BP: 139/86   Pulse: 98   Resp: 18   Temp: 98.8 °F (37.1 °C)       General: no acute distress, AAOx3  Heart: regular rate and rhythm  Lungs: clear bilaterally, no active wheezing  Abdomen: nontender, nondistended  Extremities: no pedal edema   Neuro: no focal deficits      Total time coordinating care for discharge: Greater than 30 minutes    Eric Elizabeth D.O.  [facility] Hospitalist           Electronically signed by Eric Elizabeth DO on 9/19/2024 11:43 AM         REVIEWER COMMENTS